# Patient Record
Sex: FEMALE | Race: WHITE | Employment: UNEMPLOYED | URBAN - METROPOLITAN AREA
[De-identification: names, ages, dates, MRNs, and addresses within clinical notes are randomized per-mention and may not be internally consistent; named-entity substitution may affect disease eponyms.]

---

## 2018-12-12 ENCOUNTER — OFFICE VISIT (OUTPATIENT)
Dept: INTERNAL MEDICINE CLINIC | Age: 38
End: 2018-12-12

## 2018-12-12 VITALS
HEART RATE: 74 BPM | TEMPERATURE: 98.3 F | OXYGEN SATURATION: 98 % | RESPIRATION RATE: 18 BRPM | WEIGHT: 153.4 LBS | HEIGHT: 60 IN | DIASTOLIC BLOOD PRESSURE: 76 MMHG | SYSTOLIC BLOOD PRESSURE: 122 MMHG | BODY MASS INDEX: 30.12 KG/M2

## 2018-12-12 DIAGNOSIS — Z78.9 HEPATITIS B VACCINATION STATUS UNKNOWN: ICD-10-CM

## 2018-12-12 DIAGNOSIS — Z23 ENCOUNTER FOR IMMUNIZATION: ICD-10-CM

## 2018-12-12 DIAGNOSIS — Z83.3 FAMILY HISTORY OF DIABETES MELLITUS (DM): ICD-10-CM

## 2018-12-12 DIAGNOSIS — Z11.1 ENCOUNTER FOR PPD TEST: ICD-10-CM

## 2018-12-12 DIAGNOSIS — D22.9 SKIN MOLE: ICD-10-CM

## 2018-12-12 DIAGNOSIS — Z78.9 RUBELLA IMMUNE STATUS NOT KNOWN: ICD-10-CM

## 2018-12-12 DIAGNOSIS — Z00.00 ROUTINE GENERAL MEDICAL EXAMINATION AT A HEALTH CARE FACILITY: Primary | ICD-10-CM

## 2018-12-12 DIAGNOSIS — Z13.21 ENCOUNTER FOR VITAMIN DEFICIENCY SCREENING: ICD-10-CM

## 2018-12-12 RX ORDER — ESCITALOPRAM OXALATE 20 MG/1
20 TABLET ORAL DAILY
COMMUNITY

## 2018-12-12 RX ORDER — TRIAMCINOLONE ACETONIDE 1 MG/G
PASTE DENTAL
Refills: 0 | COMMUNITY
Start: 2018-09-23 | End: 2018-12-12

## 2018-12-12 NOTE — PROGRESS NOTES
Chief Complaint   Patient presents with   [address]    Complete Physical    Form Completion     school physical form         HPI:     Deon Hardin is a 41 y.o.  female with history of depression and anxiety  who presents for complete physical exam. She is in school full time and is going to go on an externship. She needs CPE form filled. She denies any chest pain, headaches, dizziness, shortness of breath. Depression/anxiety: controlled on lexapro. She denies any suicidal or homicidal ideations. Ob/gyn: Dr. Mart Swartz at [facility] care at [facility]. She will come back in 2-3 days to get PPD read. Past Medical History:   Diagnosis Date    Anxiety     Depression     Eczema     H/O:  section 6/10/2015       Past Surgical History:   Procedure Laterality Date    HX  SECTION  ,     HX GYN             MEDICATION ALLERGIES/INTOLERANCES:   No Known Allergies          CURRENT MEDICATIONS:    Current Outpatient Medications   Medication Sig    escitalopram oxalate (LEXAPRO) 20 mg tablet Take 20 mg by mouth daily. No current facility-administered medications for this visit. Health Maintenance   Topic Date Due    DTaP/Tdap/Td series (1 - Tdap) 10/11/2002    PAP AKA CERVICAL CYTOLOGY  10/11/2002    Influenza Age 5 to Adult  2018         FAMILY HISTORY:   Family History   Problem Relation Age of Onset    Cancer Mother         non-Hodgkins lymphoma    Diabetes Father         per pt. from exposure to agent orange    Cancer Sister         breast ca (dx'd at age 31yo)       SOCIAL HISTORY:   She  reports that  has never smoked. she has never used smokeless tobacco.  She  reports that she does not drink alcohol.       HEALTH MAINTENANCE AND SCREENING:  Pap smear:   TDAP: due  Flu shot: due    ROS:   General: negative for - chills, fatigue, fever, weight loss or weight gain, night sweats  HEENT: negative for - no sore throat, nasal congestion, vision problems or ear problems  Resp: negative for - cough, shortness of breath or wheezing  CV: negative for - chest pain, palpitations, orthopnea or PND,  GI: negative for - abdominal pain, change in bowel habits, constipation, diarrhea, blood or black tarry stools, or nausea/vomiting  : negative for - dysuria, hematuria, incontinence, pelvic pain or vulvar/vaginal symptoms  Heme: negative for -excessive bleeding or bruising  Endo: negative for - hot flashes, polydipsia/polyuria or hot or cold intolerance  MSK: negative for - joint pain, joint swelling or muscle pain  Neuro: negative for - numbness, tingling, headache or dizziness  Derm: negative for - dry skin, hair changes, rash or skin lesion changes  Psych: negative for -  irritability or mood swings or insomnia, positive for anxiety, depression which is under control with the medication. OBJECTIVE:  PHYSICAL EXAM: Vitals:   Vitals:    12/12/18 1345   BP: 122/76   Pulse: 74   Resp: 18   Temp: 98.3 °F (36.8 °C)   TempSrc: Oral   SpO2: 98%   Weight: 153 lb 6.4 oz (69.6 kg)   Height: 5' (1.524 m)     Generally: Pleasant female in no acute distress    HEENT exam: Head: atraumatic     Eyes: Pupils equally round and reactive to light, Fundoscopic exam is                       normal               Ears: bilaterally: Normal tympanic membrane, no erythema or exudate,                         normal light Reflex    Nares: moist mucosa, no erythema               Mouth: clear, no erythema or exudate     Neck: supple, no lymphadenopathy, negative thyromegaly, negative                                   carotid bruits bilaterally    Cardiac exam: regular, rate, and rhythm. No murmurs, gallops, or rubs. Normal S1 and S2.    Pulmonary exam: Clear to ausculation bilaterally    Abdominal exam: Positive bowel sounds in all four quadrants, soft, nondistended, nontender. No hepatosplenomegaly. Extremities: 2+ dorsalis pedis bilaterally. No pedal edema bilaterally.     Musculoskeletal exam: 5 out of 5 strength in upper and lower extremities bilaterally. Good range of motion in upper and lower extremities. 2 out of 2 reflexes in upper and lower extremities bilaterally. Neurological exam: Cranial nerves II-XII all intact. Normal sensation in upper and lower extremities. Normal gait. Bilateral breast exam: no masses felt, no TTP, no nipple discharge, bilateral axillae normal, no masses felt    Skin: moles on side of nose and on tip of nose. Will refer to dermatology as pt has a family history of grandmother with melanoma. LABS/RADIOLOGICAL TESTS:   Lab Results   Component Value Date/Time    WBC 9.9 06/10/2015 02:00 AM    HGB 12.1 06/11/2015 06:17 AM    HCT 34.8 (L) 06/11/2015 06:17 AM    PLATELET 718 50/78/6828 02:00 AM     No results found for: NA, K, CL, CO2, GLU, BUN, CREA  No results found for: CHOL, CHOLX, CHLST, CHOLV, HDL, LDL, LDLC, DLDLP, TGLX, TRIGL, TRIGP  No results found for: GPT    Previous labs    ASSESSMENT/PLAN:      ICD-10-CM ICD-9-CM    1. Routine general medical examination at a health care facility Z00.00 V70.0 TSH 3RD GENERATION      CBC W/O DIFF      METABOLIC PANEL, COMPREHENSIVE      LIPID PANEL      HEMOGLOBIN A1C WITH EAG      URINALYSIS W/ RFLX MICROSCOPIC      VITAMIN D, 25 HYDROXY   2. Skin mole D22.9 216.9 REFERRAL TO DERMATOLOGY   3. Encounter for immunization Z23 V03.89 TETANUS, DIPHTHERIA TOXOIDS AND ACELLULAR PERTUSSIS VACCINE (TDAP), IN INDIVIDS. >=7, IM      INFLUENZA VIRUS VAC QUAD,SPLIT,PRESV FREE SYRINGE IM   4. Rubella immune status not known Z78.9 V49.89 RUBELLA AB, IGG   5. Family history of diabetes mellitus (DM) Z83.3 V18.0 HEMOGLOBIN A1C WITH EAG   6. Encounter for vitamin deficiency screening Z13.21 V77.99 VITAMIN D, 25 HYDROXY   7. Hepatitis B vaccination status unknown Z78.9 V49.89 HEP B SURFACE AB      HEP B SURFACE AG      HEPATITIS B CORE AB, TOTAL   8.  Encounter for PPD test Z11.1 V74.1 AMB POC TUBERCULOSIS, INTRADERMAL (SKIN TEST) 9. Patient verbalized understanding and agreement with the plan. 10. Patient was given after visit summary. 11.   Follow-up Disposition:  Return in about 1 year (around 12/12/2019) for CPE. or sooner if worsening symptoms.         Vilma Cardozo MD

## 2018-12-12 NOTE — PROGRESS NOTES
ROOM # 1    Mckenna Delvalle presents today for   Chief Complaint   Patient presents with   [address]    Complete Physical    Form Completion     school physical form       Mckenna Delvalle preferred language for health care discussion is english/other. Is someone accompanying this pt? no    Is the patient using any DME equipment during OV? no    Depression Screening:  PHQ over the last two weeks 12/12/2018   Little interest or pleasure in doing things Not at all   Feeling down, depressed, irritable, or hopeless Not at all   Total Score PHQ 2 0       Learning Assessment:  Learning Assessment 12/12/2018   PRIMARY LEARNER Patient   HIGHEST LEVEL OF EDUCATION - PRIMARY LEARNER  2 YEARS [facility] PRIMARY LEARNER NONE   CO-LEARNER CAREGIVER No   PRIMARY LANGUAGE ENGLISH   LEARNER PREFERENCE PRIMARY DEMONSTRATION   ANSWERED BY patient   RELATIONSHIP SELF       Abuse Screening:  Abuse Screening Questionnaire 12/12/2018   Do you ever feel afraid of your partner? N   Are you in a relationship with someone who physically or mentally threatens you? N   Is it safe for you to go home? Y       Fall Risk  No flowsheet data found. Health Maintenance reviewed and discussed per provider. Yes    Mckenna Delvalle is due for   Health Maintenance Due   Topic Date Due    DTaP/Tdap/Td series (1 - Tdap) 10/11/2002    PAP AKA CERVICAL CYTOLOGY  10/11/2002    Influenza Age 5 to Adult  08/01/2018   pt. Dure for pap next year      Please order/place referral if appropriate. Advance Directive:  1. Do you have an advance directive in place? Patient Reply: no    2. If not, would you like material regarding how to put one in place? Patient Reply: no    Coordination of Care:  1. Have you been to the ER, urgent care clinic since your last visit? Hospitalized since your last visit? Urgent care in October for dental issue    2.  Have you seen or consulted any other health care providers outside of the [address] [place] [address] since your last visit? Include any pap smears or colon screening. no    Immunization History   Administered Date(s) Administered    Influenza Vaccine (Quad) PF 12/12/2018    TB Skin Test (PPD) Intradermal 12/12/2018    Tdap 12/12/2018     Immunization administered by Gunnar Madsen LPN with pt's consent. Patient tolerated procedure well. Pt. Observed for 10 mins. No reactions noted/reported, VIS given. PPD Placement note  Americo Jin, 41 y.o. female is here today for placement of PPD test  Reason for PPD test: school  Pt taken PPD test before: no  Verified in allergy area and with patient that they are not allergic to the products PPD is made of (Phenol or Tween). Yes  Is patient taking any oral or IV steroid medication now or have they taken it in the last month? no  Has the patient ever received the BCG vaccine?: no  Has the patient been in recent contact with anyone known or suspected of having active TB disease?: no       Date of exposure (if applicable): NA       Name of person they were exposed to (if applicable): NA  Patient's Country of origin?: [place]  O: Alert and oriented in NAD. P:  PPD placed on 12/12/2018. Patient advised to return for reading within 48-72 hours. Pt. Given a copy of paperwork and advised when to return for reading. No other questions/concerns at this time.

## 2018-12-13 ENCOUNTER — DOCUMENTATION ONLY (OUTPATIENT)
Dept: INTERNAL MEDICINE CLINIC | Age: 38
End: 2018-12-13

## 2018-12-13 LAB
25(OH)D3 SERPL-MCNC: 38.8 NG/ML (ref 32–100)
A-G RATIO,AGRAT: 1.5 RATIO (ref 1.1–2.6)
ALBUMIN SERPL-MCNC: 4.6 G/DL (ref 3.5–5)
ALP SERPL-CCNC: 77 U/L (ref 25–115)
ALT SERPL-CCNC: 16 U/L (ref 5–40)
ANION GAP SERPL CALC-SCNC: 15 MMOL/L
AST SERPL W P-5'-P-CCNC: 27 U/L (ref 10–37)
AVG GLU, 10930: 101 MG/DL (ref 91–123)
BILIRUB SERPL-MCNC: 0.2 MG/DL (ref 0.2–1.2)
BILIRUB UR QL: NEGATIVE
BUN SERPL-MCNC: 18 MG/DL (ref 6–22)
CALCIUM SERPL-MCNC: 9 MG/DL (ref 8.4–10.5)
CHLORIDE SERPL-SCNC: 100 MMOL/L (ref 98–110)
CHOLEST SERPL-MCNC: 136 MG/DL (ref 110–200)
CO2 SERPL-SCNC: 26 MMOL/L (ref 20–32)
CREAT SERPL-MCNC: 0.8 MG/DL (ref 0.5–1.2)
ERYTHROCYTE [DISTWIDTH] IN BLOOD BY AUTOMATED COUNT: 12 % (ref 10–15.5)
GFRAA, 66117: >60
GFRNA, 66118: >60
GLOBULIN,GLOB: 3.1 G/DL (ref 2–4)
GLUCOSE SERPL-MCNC: 87 MG/DL (ref 70–99)
GLUCOSE UR QL: NEGATIVE MG/DL
HBA1C MFR BLD HPLC: 5.2 % (ref 4.8–5.9)
HBV SURFACE AB SER RIA-ACNC: DETECTED
HCT VFR BLD AUTO: 39.6 % (ref 35.1–46.5)
HDLC SERPL-MCNC: 2.6 MG/DL (ref 0–5)
HDLC SERPL-MCNC: 52 MG/DL (ref 40–59)
HEP B CORE AB, TOTAL, 006718: NORMAL
HEP B SURFACE AG SCRN, 006510: NORMAL
HGB BLD-MCNC: 13.4 G/DL (ref 11.7–15.5)
HGB UR QL STRIP: NEGATIVE
KETONES UR QL STRIP.AUTO: NEGATIVE MG/DL
LDLC SERPL CALC-MCNC: 55 MG/DL (ref 50–99)
LEUKOCYTE ESTERASE: NEGATIVE
MCH RBC QN AUTO: 31 PG (ref 26–34)
MCHC RBC AUTO-ENTMCNC: 34 G/DL (ref 31–36)
MCV RBC AUTO: 91 FL (ref 80–95)
NITRITE UR QL STRIP.AUTO: NEGATIVE
PH UR STRIP: 6 PH (ref 5–8)
PLATELET # BLD AUTO: 225 K/UL (ref 140–440)
PMV BLD AUTO: 10.7 FL (ref 9–13)
POTASSIUM SERPL-SCNC: 4.4 MMOL/L (ref 3.5–5.5)
PROT SERPL-MCNC: 7.7 G/DL (ref 6.4–8.3)
PROT UR QL STRIP: NEGATIVE MG/DL
RBC # BLD AUTO: 4.37 M/UL (ref 3.8–5.2)
RUBV IGG SERPL IA-ACNC: 2.9 AI
SODIUM SERPL-SCNC: 141 MMOL/L (ref 133–145)
SP GR UR: 1.01 (ref 1–1.03)
TRIGL SERPL-MCNC: 147 MG/DL (ref 40–149)
TSH SERPL DL<=0.005 MIU/L-ACNC: 3.12 MCU/ML (ref 0.27–4.2)
UROBILINOGEN UR STRIP-MCNC: <2 MG/DL
VLDLC SERPL CALC-MCNC: 29 MG/DL (ref 8–30)
WBC # BLD AUTO: 7.5 K/UL (ref 4–11)

## 2018-12-14 LAB
MM INDURATION POC: 0 MM (ref 0–5)
PPD POC: NEGATIVE NEGATIVE

## 2018-12-17 ENCOUNTER — TELEPHONE (OUTPATIENT)
Dept: INTERNAL MEDICINE CLINIC | Age: 38
End: 2018-12-17

## 2018-12-17 DIAGNOSIS — Z76.89 ENCOUNTER FOR WEIGHT MANAGEMENT: Primary | ICD-10-CM

## 2018-12-17 RX ORDER — PHENTERMINE HYDROCHLORIDE 37.5 MG/1
37.5 TABLET ORAL
Qty: 30 TAB | Refills: 3 | Status: SHIPPED | OUTPATIENT
Start: 2018-12-17 | End: 2019-02-27 | Stop reason: SDUPTHER

## 2018-12-17 NOTE — TELEPHONE ENCOUNTER
TSH normal. Will start adipex for weight management. She will need to f/u with me in 1 month to check if medication is working. Printed rx for:    Requested Prescriptions     Signed Prescriptions Disp Refills    phentermine (ADIPEX-P) 37.5 mg tablet 30 Tab 3     Sig: Take 1 Tab by mouth every morning. Max Daily Amount: 37.5 mg. Authorizing Provider: Alyssa Cho  and cannot find on .

## 2018-12-17 NOTE — TELEPHONE ENCOUNTER
Pt contacted at home number. 2 pt identifiers confirmed. Pt informed of below. Pt verbalized understanding. Pt scheduled for follow up Monday, January 21, 2019 11:00 AM.  No other questions at this time.

## 2019-02-27 DIAGNOSIS — Z76.89 ENCOUNTER FOR WEIGHT MANAGEMENT: ICD-10-CM

## 2019-02-27 RX ORDER — PHENTERMINE HYDROCHLORIDE 37.5 MG/1
37.5 TABLET ORAL
Qty: 30 TAB | Refills: 3 | Status: SHIPPED | OUTPATIENT
Start: 2019-02-27 | End: 2019-05-02 | Stop reason: SDUPTHER

## 2019-02-27 NOTE — TELEPHONE ENCOUNTER
Checked  and no aberrancies seen. Printed rx for:    Requested Prescriptions     Signed Prescriptions Disp Refills    phentermine (ADIPEX-P) 37.5 mg tablet 30 Tab 3     Sig: Take 1 Tab by mouth every morning. Max Daily Amount: 37.5 mg. Authorizing Provider: Shay Resendiz     Please let pt know that this is ready for .

## 2019-02-27 NOTE — TELEPHONE ENCOUNTER
Requested Prescriptions     Pending Prescriptions Disp Refills    phentermine (ADIPEX-P) 37.5 mg tablet 30 Tab 3     Sig: Take 1 Tab by mouth every morning.  Max Daily Amount: 37.5 mg.

## 2019-04-30 DIAGNOSIS — Z76.89 ENCOUNTER FOR WEIGHT MANAGEMENT: ICD-10-CM

## 2019-04-30 RX ORDER — PHENTERMINE HYDROCHLORIDE 37.5 MG/1
37.5 TABLET ORAL
Qty: 30 TAB | Refills: 3 | OUTPATIENT
Start: 2019-04-30

## 2019-04-30 NOTE — TELEPHONE ENCOUNTER
Patient request, last OV 3/7/18, next scheduled 5/20/19.    ,  Requested Prescriptions     Pending Prescriptions Disp Refills    phentermine (ADIPEX-P) 37.5 mg tablet 30 Tab 3     Sig: Take 1 Tab by mouth every morning.  Max Daily Amount: 37.5 mg.

## 2019-04-30 NOTE — TELEPHONE ENCOUNTER
Denied:    Requested Prescriptions     Refused Prescriptions Disp Refills    phentermine (ADIPEX-P) 37.5 mg tablet 30 Tab 3     Sig: Take 1 Tab by mouth every morning. Max Daily Amount: 37.5 mg. Refused By: Modesto Stephens     Reason for Refusal: Patient has requested refill too soon     This was last filled on 4/19/19. This is due on 5/19/19.

## 2019-05-02 DIAGNOSIS — Z76.89 ENCOUNTER FOR WEIGHT MANAGEMENT: ICD-10-CM

## 2019-05-04 NOTE — TELEPHONE ENCOUNTER
PCP: None    Last appt: 12/12/2018  No future appointments. Requested Prescriptions     Pending Prescriptions Disp Refills    phentermine (ADIPEX-P) 37.5 mg tablet 30 Tab 3     Sig: Take 1 Tab by mouth every morning. Max Daily Amount: 37.5 mg.       Request for a 30 or 90 day supply? Provider Discretion    Pharmacy: [facility]    Other Comments:   printed and placed in PCP medication refill review folder.      Last UDS date: none  Pain contract signed: none

## 2019-05-06 RX ORDER — PHENTERMINE HYDROCHLORIDE 37.5 MG/1
37.5 TABLET ORAL
Qty: 30 TAB | Refills: 0 | Status: SHIPPED | OUTPATIENT
Start: 2019-05-06 | End: 2019-05-22 | Stop reason: SDUPTHER

## 2019-05-06 NOTE — TELEPHONE ENCOUNTER
Patient contacted at home number. 2 patient identifiers confirmed. Patient informed of below. Patient verbalized understanding. Patient states she will schedule an appointment when she comes into office to  prescription. No other questions at this time.

## 2019-05-06 NOTE — TELEPHONE ENCOUNTER
Printed rx for:    Requested Prescriptions     Signed Prescriptions Disp Refills    phentermine (ADIPEX-P) 37.5 mg tablet 30 Tab 0     Sig: Take 1 Tab by mouth every morning. Max Daily Amount: 37.5 mg. Authorizing Provider: Andrew Fraga   She will need to make a f/u appt. To see how this medication is working to get more refills.

## 2019-05-22 ENCOUNTER — OFFICE VISIT (OUTPATIENT)
Dept: INTERNAL MEDICINE CLINIC | Age: 39
End: 2019-05-22

## 2019-05-22 VITALS
WEIGHT: 134 LBS | HEIGHT: 60 IN | TEMPERATURE: 98.3 F | HEART RATE: 80 BPM | SYSTOLIC BLOOD PRESSURE: 121 MMHG | OXYGEN SATURATION: 98 % | DIASTOLIC BLOOD PRESSURE: 78 MMHG | RESPIRATION RATE: 17 BRPM | BODY MASS INDEX: 26.31 KG/M2

## 2019-05-22 DIAGNOSIS — Z76.89 ENCOUNTER FOR WEIGHT MANAGEMENT: ICD-10-CM

## 2019-05-22 RX ORDER — PHENTERMINE HYDROCHLORIDE 37.5 MG/1
37.5 TABLET ORAL
Qty: 30 TAB | Refills: 0 | Status: CANCELLED | OUTPATIENT
Start: 2019-05-22

## 2019-05-22 RX ORDER — PHENTERMINE HYDROCHLORIDE 37.5 MG/1
37.5 TABLET ORAL
Qty: 30 TAB | Refills: 3 | Status: SHIPPED | OUTPATIENT
Start: 2019-05-22 | End: 2019-07-29 | Stop reason: SDUPTHER

## 2019-05-22 NOTE — PROGRESS NOTES
ROOM # 1  Identified pt with two pt identifiers(name and ). Reviewed record in preparation for visit and have obtained necessary documentation. Chief Complaint   Patient presents with    Medication Refill      Natalie Peguero preferred language for health care discussion is english/other. Is the patient using any DME equipment during OV? NO    Natalie Peguero is due for:  Health Maintenance Due   Topic    PAP AKA CERVICAL CYTOLOGY      Health Maintenance reviewed and discussed per provider  Please order/place referral if appropriate. Advance Directive:  1. Do you have an advance directive in place? Patient Reply: NO    2. If not, would you like material regarding how to put one in place? NO    Coordination of Care:  1. Have you been to the ER, urgent care clinic since your last visit? Hospitalized since your last visit? NO    2. Have you seen or consulted any other health care providers outside of the [address] [place] [address] since your last visit? Include any pap smears or colon screening. NO    Patient is accompanied by self I have received verbal consent from Natalie Peguero to discuss any/all medical information while they are present in the room. Learning Assessment:  Learning Assessment 2018   PRIMARY LEARNER Patient   HIGHEST LEVEL OF EDUCATION - PRIMARY LEARNER  2 YEARS OF COLLEGE   BARRIERS PRIMARY LEARNER NONE   CO-LEARNER CAREGIVER No   PRIMARY LANGUAGE ENGLISH   LEARNER PREFERENCE PRIMARY DEMONSTRATION   ANSWERED BY patient   RELATIONSHIP SELF     Depression Screening:  3 most recent PHQ Screens 2018   Little interest or pleasure in doing things Not at all   Feeling down, depressed, irritable, or hopeless Not at all   Total Score PHQ 2 0     Abuse Screening:  Abuse Screening Questionnaire 2018   Do you ever feel afraid of your partner? N   Are you in a relationship with someone who physically or mentally threatens you? N   Is it safe for you to go home?  Y     Fall Risk  n/i

## 2019-05-22 NOTE — PROGRESS NOTES
Chief Complaint   Patient presents with    Medication Refill       HPI:     Dannie Muñiz is a 41 y.o.  female with history of  Depression and anxiety  here for the above complaint. She has lost 19 pounds since 2018. She is working on exercising and diet and adipex. She denies any chest pain, shortness of breath, abdominal pain, headaches or dizziness. She has no side effects from the adipex. Past Medical History:   Diagnosis Date    Anxiety     Depression     Eczema     H/O:  section 6/10/2015     Past Surgical History:   Procedure Laterality Date    HX  SECTION  ,     HX GYN       Current Outpatient Medications   Medication Sig    phentermine (ADIPEX-P) 37.5 mg tablet Take 1 Tab by mouth every morning. Max Daily Amount: 37.5 mg.    escitalopram oxalate (LEXAPRO) 20 mg tablet Take 20 mg by mouth daily. No current facility-administered medications for this visit. Health Maintenance   Topic Date Due    PAP AKA CERVICAL CYTOLOGY  10/11/2002    Influenza Age 5 to Adult  2019    DTaP/Tdap/Td series (2 - Td) 2028    Pneumococcal 0-64 years  Aged [employer] History   Administered Date(s) Administered    Influenza Vaccine (Quad) PF 2018    TB Skin Test (PPD) Intradermal 2018    Tdap 2018     No LMP recorded. Allergies and Intolerances:   No Known Allergies    Family History:   Family History   Problem Relation Age of Onset    Cancer Mother         non-Hodgkins lymphoma    Diabetes Father         per pt. from exposure to agent orange    Cancer Sister         breast ca (dx'd at age 31yo)       Social History:   She  reports that she has never smoked. She has never used smokeless tobacco.  She  reports that she does not drink alcohol.             ·     OBJECTIVE:   Physical exam:   Visit Vitals  /78 (BP 1 Location: Left arm, BP Patient Position: Sitting)   Pulse 80   Temp 98.3 °F (36.8 °C) (Oral)   Resp 17   Ht 5' (1.524 m)   Wt 134 lb (60.8 kg)   SpO2 98%   BMI 26.17 kg/m²        Generally: Pleasant female in no acute distress  Cardiac Exam: regular, rate, and rhythm. Normal S1 and S2. No murmurs, gallops, or rubs  Pulmonary exam: Clear to auscultation bilaterally  Abdominal exam: Positive bowel sounds in all four quadrants, soft, nondistended, nontender  Extremities: 2+ dorsalis pedis pulses bilaterally. No pedal edema    bilaterally    LABS/RADIOLOGICAL TESTS:  Lab Results   Component Value Date/Time    WBC 7.5 2018 03:22 PM    HGB 13.4 2018 03:22 PM    HCT 39.6 2018 03:22 PM    PLATELET 529  03:22 PM     Lab Results   Component Value Date/Time    Sodium 141 2018 03:22 PM    Potassium 4.4 2018 03:22 PM    Chloride 100 2018 03:22 PM    CO2 26 2018 03:22 PM    Glucose 87 2018 03:22 PM    BUN 18 2018 03:22 PM    Creatinine 0.8 2018 03:22 PM     Lab Results   Component Value Date/Time    Cholesterol, total 136 2018 03:22 PM    HDL Cholesterol 52 2018 03:22 PM    LDL, calculated 55 2018 03:22 PM    Triglyceride 147 2018 03:22 PM     No results found for: GPT    Previous labs    ASSESSMENT/PLAN:    1. Encounter for weight management  -     phentermine (ADIPEX-P) 37.5 mg tablet; Take 1 Tab by mouth every morning. Max Daily Amount: 37.5 mg. Checked  and no aberrancies seen. Continue to work on diet and exercise. 2.   Requested Prescriptions     Signed Prescriptions Disp Refills    phentermine (ADIPEX-P) 37.5 mg tablet 30 Tab 3     Sig: Take 1 Tab by mouth every morning. Max Daily Amount: 37.5 mg.     3. Patient verbalized understanding and agreement with the plan. 4. Patient was given an after-visit summary. 5.   Follow-up and Dispositions    · Return in about 6 months (around 2019) for f/u weight  Management  or sooner if worsening symptoms.                 Herold Apgar, M.D.

## 2019-07-29 DIAGNOSIS — Z76.89 ENCOUNTER FOR WEIGHT MANAGEMENT: ICD-10-CM

## 2019-07-29 RX ORDER — PHENTERMINE HYDROCHLORIDE 37.5 MG/1
37.5 TABLET ORAL
Qty: 30 TAB | Refills: 3 | Status: SHIPPED | OUTPATIENT
Start: 2019-07-29 | End: 2019-12-30 | Stop reason: SDUPTHER

## 2019-07-29 NOTE — TELEPHONE ENCOUNTER
Checked  and no aberrancies seen. Printed rx for:    Requested Prescriptions     Signed Prescriptions Disp Refills    phentermine (ADIPEX-P) 37.5 mg tablet 30 Tab 3     Sig: Take 1 Tab by mouth every morning. Max Daily Amount: 37.5 mg. Authorizing Provider: Jan Moctezuma     This is ready for .

## 2019-08-05 ENCOUNTER — TELEPHONE (OUTPATIENT)
Dept: INTERNAL MEDICINE CLINIC | Age: 39
End: 2019-08-05

## 2019-08-05 NOTE — TELEPHONE ENCOUNTER
Prior Auth request received for patient medication Phentermine Hcl 37.5 mg tablets. Prior auth completed via cover my [employer]. [facility] is reviewing your PA request. Typically an electronic response will be received within 72 hours.

## 2019-08-06 NOTE — TELEPHONE ENCOUNTER
Patient contacted at home number. 2 patient identifiers confirmed. Patient informed of below. Patient verbalized understanding. Patient states that it is not a problem that her insurance will not pay for medication. No other questions at this time.

## 2019-08-06 NOTE — TELEPHONE ENCOUNTER
Please notify pt. The other option is getting it out of pocket and using [employer] coupon if pt still wants to take the medication.

## 2019-08-06 NOTE — TELEPHONE ENCOUNTER
Prior auth for phentermine Hcl has been denied. Patient does not have BMI of 40 or greater; or BMI of 35 with co-morbid conditions.

## 2019-12-30 DIAGNOSIS — Z76.89 ENCOUNTER FOR WEIGHT MANAGEMENT: ICD-10-CM

## 2019-12-30 RX ORDER — PHENTERMINE HYDROCHLORIDE 37.5 MG/1
37.5 TABLET ORAL
Qty: 30 TAB | Refills: 2 | Status: SHIPPED | OUTPATIENT
Start: 2019-12-30

## 2019-12-30 NOTE — PROGRESS NOTES
Pt came in late today for her 7am appt. Unable to see her, but she needed a refill of her adipex. Refilled 30 day supply with 2 refills until she can find a new PCP. Sent electronically:    Requested Prescriptions     Signed Prescriptions Disp Refills    phentermine (ADIPEX-P) 37.5 mg tablet 30 Tab 2     Sig: Take 1 Tab by mouth every morning. Max Daily Amount: 37.5 mg. Checked  and no aberrancies seen.

## 2020-01-23 ENCOUNTER — DOCUMENTATION ONLY (OUTPATIENT)
Dept: INTERNAL MEDICINE CLINIC | Age: 40
End: 2020-01-23